# Patient Record
Sex: FEMALE | URBAN - METROPOLITAN AREA
[De-identification: names, ages, dates, MRNs, and addresses within clinical notes are randomized per-mention and may not be internally consistent; named-entity substitution may affect disease eponyms.]

---

## 2024-02-28 ENCOUNTER — OFFICE (AMBULATORY)
Dept: URBAN - METROPOLITAN AREA CLINIC 76 | Facility: CLINIC | Age: 79
End: 2024-02-28
Payer: MEDICAID

## 2024-02-28 VITALS
HEART RATE: 70 BPM | HEIGHT: 63 IN | WEIGHT: 187 LBS | OXYGEN SATURATION: 97 % | DIASTOLIC BLOOD PRESSURE: 74 MMHG | SYSTOLIC BLOOD PRESSURE: 132 MMHG

## 2024-02-28 DIAGNOSIS — R10.12 LEFT UPPER QUADRANT PAIN: ICD-10-CM

## 2024-02-28 DIAGNOSIS — R10.11 RIGHT UPPER QUADRANT PAIN: ICD-10-CM

## 2024-02-28 DIAGNOSIS — K57.32 DIVERTICULITIS OF LARGE INTESTINE WITHOUT PERFORATION OR ABS: ICD-10-CM

## 2024-02-28 DIAGNOSIS — R14.0 ABDOMINAL DISTENSION (GASEOUS): ICD-10-CM

## 2024-02-28 DIAGNOSIS — R11.0 NAUSEA: ICD-10-CM

## 2024-02-28 DIAGNOSIS — R10.13 EPIGASTRIC PAIN: ICD-10-CM

## 2024-02-28 DIAGNOSIS — K21.9 GASTRO-ESOPHAGEAL REFLUX DISEASE WITHOUT ESOPHAGITIS: ICD-10-CM

## 2024-02-28 PROCEDURE — 99204 OFFICE O/P NEW MOD 45 MIN: CPT | Performed by: INTERNAL MEDICINE

## 2024-02-28 NOTE — SERVICEHPINOTES
Ms. Ortega is a pleasant 78-year-old female whose had GERD symptoms for "years".  She's here now because symptoms are getting worse.  She's been on multiple PPIs, she's currently on Nexium but her symptoms persist.  It doesn't matter what she eats, she'll sometimes have symptoms with water.  She also gets upper abdominal pain and increased gas postprandially as well as nausea but there is no emesis.  There is no weight loss.  There is no hematemesis or melena.  She does not take anti-inflammatories.  She takes Tylenol.  Had a previous cholecystectomy appendectomy and hernia repair.
br
br She does have constipation which is not new.  There is no family history of polyps colitis or colon cancer.  She was evaluated in November and was diagnosed based on CT scan and symptoms with diverticulitis.  She is due for colonoscopy.  She is in no acute distress.

## 2024-02-28 NOTE — SERVICENOTES
records reviewed.  She had a CT scan and laboratories done in November.  CT showed acute diverticulitis and fatty liver.  Hepatic function panel and CBC were within normal limits.

## 2024-03-28 VITALS
SYSTOLIC BLOOD PRESSURE: 179 MMHG | HEART RATE: 83 BPM | SYSTOLIC BLOOD PRESSURE: 172 MMHG | HEART RATE: 97 BPM | WEIGHT: 142 LBS | DIASTOLIC BLOOD PRESSURE: 62 MMHG | RESPIRATION RATE: 17 BRPM | HEIGHT: 61 IN | DIASTOLIC BLOOD PRESSURE: 79 MMHG | SYSTOLIC BLOOD PRESSURE: 153 MMHG | SYSTOLIC BLOOD PRESSURE: 152 MMHG | TEMPERATURE: 97.3 F | SYSTOLIC BLOOD PRESSURE: 106 MMHG | SYSTOLIC BLOOD PRESSURE: 98 MMHG | DIASTOLIC BLOOD PRESSURE: 42 MMHG | RESPIRATION RATE: 13 BRPM | SYSTOLIC BLOOD PRESSURE: 111 MMHG | HEART RATE: 70 BPM | DIASTOLIC BLOOD PRESSURE: 45 MMHG | HEART RATE: 65 BPM | SYSTOLIC BLOOD PRESSURE: 166 MMHG | RESPIRATION RATE: 15 BRPM | DIASTOLIC BLOOD PRESSURE: 52 MMHG | SYSTOLIC BLOOD PRESSURE: 92 MMHG | HEART RATE: 76 BPM | OXYGEN SATURATION: 98 % | SYSTOLIC BLOOD PRESSURE: 190 MMHG | SYSTOLIC BLOOD PRESSURE: 130 MMHG | HEART RATE: 98 BPM | RESPIRATION RATE: 18 BRPM | OXYGEN SATURATION: 95 % | DIASTOLIC BLOOD PRESSURE: 54 MMHG | SYSTOLIC BLOOD PRESSURE: 170 MMHG | DIASTOLIC BLOOD PRESSURE: 57 MMHG | HEART RATE: 89 BPM | OXYGEN SATURATION: 94 % | DIASTOLIC BLOOD PRESSURE: 63 MMHG | RESPIRATION RATE: 14 BRPM | RESPIRATION RATE: 12 BRPM | SYSTOLIC BLOOD PRESSURE: 112 MMHG | DIASTOLIC BLOOD PRESSURE: 43 MMHG | OXYGEN SATURATION: 97 % | SYSTOLIC BLOOD PRESSURE: 131 MMHG | OXYGEN SATURATION: 96 % | RESPIRATION RATE: 16 BRPM | HEART RATE: 92 BPM | HEART RATE: 84 BPM | OXYGEN SATURATION: 92 % | HEART RATE: 82 BPM | HEART RATE: 86 BPM | HEART RATE: 88 BPM | DIASTOLIC BLOOD PRESSURE: 70 MMHG | HEART RATE: 69 BPM | OXYGEN SATURATION: 99 % | SYSTOLIC BLOOD PRESSURE: 143 MMHG | SYSTOLIC BLOOD PRESSURE: 218 MMHG | DIASTOLIC BLOOD PRESSURE: 80 MMHG | HEART RATE: 94 BPM | DIASTOLIC BLOOD PRESSURE: 60 MMHG | DIASTOLIC BLOOD PRESSURE: 56 MMHG | TEMPERATURE: 69.9 F

## 2024-04-01 ENCOUNTER — AMBULATORY SURGICAL CENTER (AMBULATORY)
Dept: URBAN - METROPOLITAN AREA SURGERY 17 | Facility: SURGERY | Age: 79
End: 2024-04-01
Payer: MEDICARE

## 2024-04-01 ENCOUNTER — OFFICE (AMBULATORY)
Dept: URBAN - METROPOLITAN AREA PATHOLOGY 4 | Facility: PATHOLOGY | Age: 79
End: 2024-04-01
Payer: MEDICAID

## 2024-04-01 DIAGNOSIS — K63.5 POLYP OF COLON: ICD-10-CM

## 2024-04-01 DIAGNOSIS — K31.7 POLYP OF STOMACH AND DUODENUM: ICD-10-CM

## 2024-04-01 DIAGNOSIS — D12.3 BENIGN NEOPLASM OF TRANSVERSE COLON: ICD-10-CM

## 2024-04-01 DIAGNOSIS — Z12.11 ENCOUNTER FOR SCREENING FOR MALIGNANT NEOPLASM OF COLON: ICD-10-CM

## 2024-04-01 DIAGNOSIS — K57.30 DIVERTICULOSIS OF LARGE INTESTINE WITHOUT PERFORATION OR ABS: ICD-10-CM

## 2024-04-01 DIAGNOSIS — K44.9 DIAPHRAGMATIC HERNIA WITHOUT OBSTRUCTION OR GANGRENE: ICD-10-CM

## 2024-04-01 DIAGNOSIS — R14.0 ABDOMINAL DISTENSION (GASEOUS): ICD-10-CM

## 2024-04-01 DIAGNOSIS — R11.0 NAUSEA: ICD-10-CM

## 2024-04-01 DIAGNOSIS — K31.89 OTHER DISEASES OF STOMACH AND DUODENUM: ICD-10-CM

## 2024-04-01 DIAGNOSIS — D12.2 BENIGN NEOPLASM OF ASCENDING COLON: ICD-10-CM

## 2024-04-01 DIAGNOSIS — K29.60 OTHER GASTRITIS WITHOUT BLEEDING: ICD-10-CM

## 2024-04-01 DIAGNOSIS — K21.00 GASTRO-ESOPHAGEAL REFLUX DISEASE WITH ESOPHAGITIS, WITHOUT B: ICD-10-CM

## 2024-04-01 LAB
GI HISTOLOGY: A. STOMACH ANTRUM: (no result)
GI HISTOLOGY: B. CARDIA: (no result)
GI HISTOLOGY: C. HEPATIC FLEXURE: (no result)
GI HISTOLOGY: D. ASCENDING COLON: (no result)
GI HISTOLOGY: E. TRANSVERSE COLON: (no result)
GI HISTOLOGY: F. SPLENIC FLEXURE: (no result)
GI HISTOLOGY: G. SIGMOID COLON: (no result)
GI HISTOLOGY: PDF REPORT: (no result)

## 2024-04-01 PROCEDURE — 45385 COLONOSCOPY W/LESION REMOVAL: CPT | Mod: PT | Performed by: INTERNAL MEDICINE

## 2024-04-01 PROCEDURE — 88305 TISSUE EXAM BY PATHOLOGIST: CPT | Performed by: PATHOLOGY

## 2024-04-01 PROCEDURE — 43239 EGD BIOPSY SINGLE/MULTIPLE: CPT | Performed by: INTERNAL MEDICINE

## 2024-04-01 PROCEDURE — 88342 IMHCHEM/IMCYTCHM 1ST ANTB: CPT | Performed by: PATHOLOGY

## 2024-04-01 NOTE — SERVICEHPINOTES
Ms. Ortega is a pleasant 78-year-old female whose had GERD symptoms for "years".  She's here now because symptoms are getting worse.  She's been on multiple PPIs, she's currently on Nexium but her symptoms persist.  It doesn't matter what she eats, she'll sometimes have symptoms with water.  She also gets upper abdominal pain and increased gas postprandially as well as nausea but there is no emesis.  There is no weight loss.  There is no hematemesis or melena.  She does not take anti-inflammatories.  She takes Tylenol.  Had a previous cholecystectomy appendectomy and hernia repair.She does have constipation which is not new.  There is no family history of polyps colitis or colon cancer.  She was evaluated in November and was diagnosed based on CT scan and symptoms with diverticulitis.  She is due for colonoscopy.  She is in no acute distress.

## 2024-07-08 ENCOUNTER — OFFICE (AMBULATORY)
Dept: URBAN - METROPOLITAN AREA CLINIC 76 | Facility: CLINIC | Age: 79
End: 2024-07-08

## 2024-07-08 VITALS
SYSTOLIC BLOOD PRESSURE: 138 MMHG | WEIGHT: 148 LBS | OXYGEN SATURATION: 95 % | DIASTOLIC BLOOD PRESSURE: 76 MMHG | HEART RATE: 69 BPM | HEIGHT: 61 IN

## 2024-07-08 DIAGNOSIS — R14.0 ABDOMINAL DISTENSION (GASEOUS): ICD-10-CM

## 2024-07-08 DIAGNOSIS — R11.0 NAUSEA: ICD-10-CM

## 2024-07-08 DIAGNOSIS — K44.9 DIAPHRAGMATIC HERNIA WITHOUT OBSTRUCTION OR GANGRENE: ICD-10-CM

## 2024-07-08 DIAGNOSIS — K57.32 DIVERTICULITIS OF LARGE INTESTINE WITHOUT PERFORATION OR ABS: ICD-10-CM

## 2024-07-08 DIAGNOSIS — R10.13 EPIGASTRIC PAIN: ICD-10-CM

## 2024-07-08 DIAGNOSIS — R93.3 ABNORMAL FINDINGS ON DIAGNOSTIC IMAGING OF OTHER PARTS OF DI: ICD-10-CM

## 2024-07-08 DIAGNOSIS — K31.89 OTHER DISEASES OF STOMACH AND DUODENUM: ICD-10-CM

## 2024-07-08 DIAGNOSIS — K59.00 CONSTIPATION, UNSPECIFIED: ICD-10-CM

## 2024-07-08 DIAGNOSIS — K20.90 ESOPHAGITIS, UNSPECIFIED WITHOUT BLEEDING: ICD-10-CM

## 2024-07-08 DIAGNOSIS — R10.12 LEFT UPPER QUADRANT PAIN: ICD-10-CM

## 2024-07-08 DIAGNOSIS — K21.9 GASTRO-ESOPHAGEAL REFLUX DISEASE WITHOUT ESOPHAGITIS: ICD-10-CM

## 2024-07-08 PROBLEM — K31.7 POLYP OF STOMACH AND DUODENUM: Status: ACTIVE | Noted: 2024-04-01

## 2024-07-08 PROBLEM — K20.80 OTHER ESOPHAGITIS WITHOUT BLEEDING: Status: ACTIVE | Noted: 2024-04-01

## 2024-07-08 PROBLEM — K63.5 POLYP OF COLON: Status: ACTIVE | Noted: 2024-04-01

## 2024-07-08 PROBLEM — K57.30 DIVERTICULOSIS OF LARGE INTESTINE WITHOUT PERFORATION OR ABS: Status: ACTIVE | Noted: 2024-04-01

## 2024-07-08 PROCEDURE — 99214 OFFICE O/P EST MOD 30 MIN: CPT

## 2024-07-08 RX ORDER — PANTOPRAZOLE SODIUM 40 MG/1
80 TABLET, DELAYED RELEASE ORAL
Qty: 180 | Refills: 3 | Status: ACTIVE
Start: 2024-07-08

## 2024-07-08 RX ORDER — FAMOTIDINE 40 MG/1
40 TABLET, FILM COATED ORAL
Qty: 90 | Refills: 3 | Status: ACTIVE
Start: 2024-07-08